# Patient Record
Sex: MALE | Race: WHITE | NOT HISPANIC OR LATINO | Employment: OTHER | URBAN - NONMETROPOLITAN AREA
[De-identification: names, ages, dates, MRNs, and addresses within clinical notes are randomized per-mention and may not be internally consistent; named-entity substitution may affect disease eponyms.]

---

## 2020-10-15 ENCOUNTER — EVALUATION (OUTPATIENT)
Dept: PHYSICAL THERAPY | Facility: CLINIC | Age: 60
End: 2020-10-15
Payer: COMMERCIAL

## 2020-10-15 ENCOUNTER — TRANSCRIBE ORDERS (OUTPATIENT)
Dept: PHYSICAL THERAPY | Facility: CLINIC | Age: 60
End: 2020-10-15

## 2020-10-15 DIAGNOSIS — S46.011D STRAIN OF TENDON OF RIGHT ROTATOR CUFF, SUBSEQUENT ENCOUNTER: Primary | ICD-10-CM

## 2020-10-15 PROCEDURE — 97162 PT EVAL MOD COMPLEX 30 MIN: CPT | Performed by: PHYSICAL THERAPIST

## 2020-10-20 ENCOUNTER — OFFICE VISIT (OUTPATIENT)
Dept: PHYSICAL THERAPY | Facility: CLINIC | Age: 60
End: 2020-10-20
Payer: COMMERCIAL

## 2020-10-20 DIAGNOSIS — S46.011D STRAIN OF TENDON OF RIGHT ROTATOR CUFF, SUBSEQUENT ENCOUNTER: Primary | ICD-10-CM

## 2020-10-20 PROCEDURE — 97140 MANUAL THERAPY 1/> REGIONS: CPT | Performed by: PHYSICAL THERAPIST

## 2020-10-20 PROCEDURE — 97110 THERAPEUTIC EXERCISES: CPT | Performed by: PHYSICAL THERAPIST

## 2020-10-23 ENCOUNTER — OFFICE VISIT (OUTPATIENT)
Dept: PHYSICAL THERAPY | Facility: CLINIC | Age: 60
End: 2020-10-23
Payer: COMMERCIAL

## 2020-10-23 DIAGNOSIS — S46.011D STRAIN OF TENDON OF RIGHT ROTATOR CUFF, SUBSEQUENT ENCOUNTER: Primary | ICD-10-CM

## 2020-10-23 PROCEDURE — 97140 MANUAL THERAPY 1/> REGIONS: CPT | Performed by: PHYSICAL THERAPIST

## 2020-10-23 PROCEDURE — 97110 THERAPEUTIC EXERCISES: CPT | Performed by: PHYSICAL THERAPIST

## 2020-10-27 ENCOUNTER — OFFICE VISIT (OUTPATIENT)
Dept: PHYSICAL THERAPY | Facility: CLINIC | Age: 60
End: 2020-10-27
Payer: COMMERCIAL

## 2020-10-27 DIAGNOSIS — S46.011D STRAIN OF TENDON OF RIGHT ROTATOR CUFF, SUBSEQUENT ENCOUNTER: Primary | ICD-10-CM

## 2020-10-27 PROCEDURE — 97110 THERAPEUTIC EXERCISES: CPT

## 2020-10-27 PROCEDURE — 97140 MANUAL THERAPY 1/> REGIONS: CPT

## 2020-10-30 ENCOUNTER — OFFICE VISIT (OUTPATIENT)
Dept: PHYSICAL THERAPY | Facility: CLINIC | Age: 60
End: 2020-10-30
Payer: COMMERCIAL

## 2020-10-30 DIAGNOSIS — S46.011D STRAIN OF TENDON OF RIGHT ROTATOR CUFF, SUBSEQUENT ENCOUNTER: Primary | ICD-10-CM

## 2020-10-30 PROCEDURE — 97140 MANUAL THERAPY 1/> REGIONS: CPT

## 2020-10-30 PROCEDURE — 97110 THERAPEUTIC EXERCISES: CPT

## 2020-11-03 ENCOUNTER — OFFICE VISIT (OUTPATIENT)
Dept: PHYSICAL THERAPY | Facility: CLINIC | Age: 60
End: 2020-11-03
Payer: COMMERCIAL

## 2020-11-03 DIAGNOSIS — S46.011D STRAIN OF TENDON OF RIGHT ROTATOR CUFF, SUBSEQUENT ENCOUNTER: Primary | ICD-10-CM

## 2020-11-03 PROCEDURE — 97110 THERAPEUTIC EXERCISES: CPT | Performed by: PHYSICAL THERAPIST

## 2020-11-03 PROCEDURE — 97140 MANUAL THERAPY 1/> REGIONS: CPT | Performed by: PHYSICAL THERAPIST

## 2020-11-10 ENCOUNTER — OFFICE VISIT (OUTPATIENT)
Dept: PHYSICAL THERAPY | Facility: CLINIC | Age: 60
End: 2020-11-10
Payer: COMMERCIAL

## 2020-11-10 DIAGNOSIS — S46.011D STRAIN OF TENDON OF RIGHT ROTATOR CUFF, SUBSEQUENT ENCOUNTER: Primary | ICD-10-CM

## 2020-11-10 PROCEDURE — 97140 MANUAL THERAPY 1/> REGIONS: CPT | Performed by: PHYSICAL THERAPIST

## 2020-11-10 PROCEDURE — 97110 THERAPEUTIC EXERCISES: CPT | Performed by: PHYSICAL THERAPIST

## 2020-11-17 ENCOUNTER — OFFICE VISIT (OUTPATIENT)
Dept: PHYSICAL THERAPY | Facility: CLINIC | Age: 60
End: 2020-11-17
Payer: COMMERCIAL

## 2020-11-17 DIAGNOSIS — S46.011D STRAIN OF TENDON OF RIGHT ROTATOR CUFF, SUBSEQUENT ENCOUNTER: Primary | ICD-10-CM

## 2020-11-17 PROCEDURE — 97140 MANUAL THERAPY 1/> REGIONS: CPT

## 2020-11-17 PROCEDURE — 97110 THERAPEUTIC EXERCISES: CPT

## 2020-11-20 ENCOUNTER — OFFICE VISIT (OUTPATIENT)
Dept: PHYSICAL THERAPY | Facility: CLINIC | Age: 60
End: 2020-11-20
Payer: COMMERCIAL

## 2020-11-20 DIAGNOSIS — S46.011D STRAIN OF TENDON OF RIGHT ROTATOR CUFF, SUBSEQUENT ENCOUNTER: Primary | ICD-10-CM

## 2020-11-20 PROCEDURE — 97140 MANUAL THERAPY 1/> REGIONS: CPT | Performed by: PHYSICAL THERAPIST

## 2020-11-20 PROCEDURE — 97110 THERAPEUTIC EXERCISES: CPT | Performed by: PHYSICAL THERAPIST

## 2020-11-24 ENCOUNTER — TRANSCRIBE ORDERS (OUTPATIENT)
Dept: PHYSICAL THERAPY | Facility: CLINIC | Age: 60
End: 2020-11-24

## 2020-11-24 ENCOUNTER — OFFICE VISIT (OUTPATIENT)
Dept: PHYSICAL THERAPY | Facility: CLINIC | Age: 60
End: 2020-11-24
Payer: COMMERCIAL

## 2020-11-24 DIAGNOSIS — S46.011D STRAIN OF TENDON OF RIGHT ROTATOR CUFF, SUBSEQUENT ENCOUNTER: Primary | ICD-10-CM

## 2020-11-24 PROCEDURE — 97110 THERAPEUTIC EXERCISES: CPT | Performed by: PHYSICAL THERAPIST

## 2020-11-24 PROCEDURE — 97140 MANUAL THERAPY 1/> REGIONS: CPT | Performed by: PHYSICAL THERAPIST

## 2020-12-01 ENCOUNTER — OFFICE VISIT (OUTPATIENT)
Dept: PHYSICAL THERAPY | Facility: CLINIC | Age: 60
End: 2020-12-01
Payer: COMMERCIAL

## 2020-12-01 DIAGNOSIS — S46.011D STRAIN OF TENDON OF RIGHT ROTATOR CUFF, SUBSEQUENT ENCOUNTER: Primary | ICD-10-CM

## 2020-12-01 PROCEDURE — 97110 THERAPEUTIC EXERCISES: CPT | Performed by: PHYSICAL THERAPIST

## 2020-12-01 PROCEDURE — 97140 MANUAL THERAPY 1/> REGIONS: CPT | Performed by: PHYSICAL THERAPIST

## 2020-12-04 ENCOUNTER — OFFICE VISIT (OUTPATIENT)
Dept: PHYSICAL THERAPY | Facility: CLINIC | Age: 60
End: 2020-12-04
Payer: COMMERCIAL

## 2020-12-04 DIAGNOSIS — S46.011D STRAIN OF TENDON OF RIGHT ROTATOR CUFF, SUBSEQUENT ENCOUNTER: Primary | ICD-10-CM

## 2020-12-04 PROCEDURE — 97110 THERAPEUTIC EXERCISES: CPT | Performed by: PHYSICAL THERAPIST

## 2020-12-04 PROCEDURE — 97140 MANUAL THERAPY 1/> REGIONS: CPT | Performed by: PHYSICAL THERAPIST

## 2020-12-08 ENCOUNTER — OFFICE VISIT (OUTPATIENT)
Dept: PHYSICAL THERAPY | Facility: CLINIC | Age: 60
End: 2020-12-08
Payer: COMMERCIAL

## 2020-12-08 DIAGNOSIS — S46.011D STRAIN OF TENDON OF RIGHT ROTATOR CUFF, SUBSEQUENT ENCOUNTER: Primary | ICD-10-CM

## 2020-12-08 PROCEDURE — 97140 MANUAL THERAPY 1/> REGIONS: CPT | Performed by: PHYSICAL THERAPIST

## 2020-12-08 PROCEDURE — 97110 THERAPEUTIC EXERCISES: CPT | Performed by: PHYSICAL THERAPIST

## 2020-12-22 ENCOUNTER — OFFICE VISIT (OUTPATIENT)
Dept: PHYSICAL THERAPY | Facility: CLINIC | Age: 60
End: 2020-12-22
Payer: COMMERCIAL

## 2020-12-22 DIAGNOSIS — S46.011D STRAIN OF TENDON OF RIGHT ROTATOR CUFF, SUBSEQUENT ENCOUNTER: Primary | ICD-10-CM

## 2020-12-22 PROCEDURE — 97110 THERAPEUTIC EXERCISES: CPT | Performed by: PHYSICAL THERAPIST

## 2020-12-22 PROCEDURE — 97140 MANUAL THERAPY 1/> REGIONS: CPT | Performed by: PHYSICAL THERAPIST

## 2020-12-23 ENCOUNTER — TRANSCRIBE ORDERS (OUTPATIENT)
Dept: PHYSICAL THERAPY | Facility: CLINIC | Age: 60
End: 2020-12-23

## 2020-12-23 DIAGNOSIS — S46.011D STRAIN OF TENDON OF RIGHT ROTATOR CUFF, SUBSEQUENT ENCOUNTER: Primary | ICD-10-CM

## 2020-12-29 ENCOUNTER — OFFICE VISIT (OUTPATIENT)
Dept: PHYSICAL THERAPY | Facility: CLINIC | Age: 60
End: 2020-12-29
Payer: COMMERCIAL

## 2020-12-29 DIAGNOSIS — S46.011D STRAIN OF TENDON OF RIGHT ROTATOR CUFF, SUBSEQUENT ENCOUNTER: Primary | ICD-10-CM

## 2020-12-29 PROCEDURE — 97110 THERAPEUTIC EXERCISES: CPT | Performed by: PHYSICAL THERAPIST

## 2020-12-29 PROCEDURE — 97140 MANUAL THERAPY 1/> REGIONS: CPT | Performed by: PHYSICAL THERAPIST

## 2021-01-05 ENCOUNTER — OFFICE VISIT (OUTPATIENT)
Dept: PHYSICAL THERAPY | Facility: CLINIC | Age: 61
End: 2021-01-05
Payer: COMMERCIAL

## 2021-01-05 DIAGNOSIS — S46.011D STRAIN OF TENDON OF RIGHT ROTATOR CUFF, SUBSEQUENT ENCOUNTER: Primary | ICD-10-CM

## 2021-01-05 PROCEDURE — 97140 MANUAL THERAPY 1/> REGIONS: CPT | Performed by: PHYSICAL THERAPIST

## 2021-01-05 PROCEDURE — 97110 THERAPEUTIC EXERCISES: CPT | Performed by: PHYSICAL THERAPIST

## 2021-01-05 NOTE — PROGRESS NOTES
Daily Note     Today's date: 2021  Patient name: Toyin Siddiqi  : 1960  MRN: [id number]  Referring provider: Tess Ramos MD  Dx:   Encounter Diagnosis     ICD-10-CM    1  Strain of tendon of right rotator cuff, subsequent encounter  S46 011D                   Subjective: Patient states "I haven't heard anything from the doctor yet"  Objective: See treatment diary below  Manuals 20   Right shoulder PROM 15 min 15 min 15 min 15 min 15min                            Neuro Re-Ed                                                                 Ther Ex        UBE 10 min 10 min 10 min 10 min 10 min   MTP/LTP GTB 3x10 GTB 3x10 GTB 3x10 GTB 3x10 GTB 3x10   IR/ER GTB 3x10 GTB 3x10 each GTB 3x10 each GTB 3x10 GTB 3x10   Shrugs 3# 3x10 3# 3x10 3# 3x10 3# 3x10 3# 3x10   Retractions        Bicep Curls 3# 3x10 3# 3x10 3# 3x10 3# 3x10 3# 3x10   Pulleys 30x flx 30x flx 30x Flx 30x flx 30x    Finger Ladder        Table Slides 30x flx 30x flx      Cane Flexion 30x 30x  30x 30x 30x   Cane Abduction        Scaption 15x  15x 15x 15x                   Ther Activity                        Gait Training                        Modalities                            Assessment: Tolerated treatment well  Patient exhibited good technique with therapeutic exercises  Patient encouraged to call MD for MRI results and further treatment  Patient with good PROM noted each direction, some IR tightness noted today  Plan: Continue per plan of care        Precautions: None

## 2021-01-15 ENCOUNTER — APPOINTMENT (OUTPATIENT)
Dept: PHYSICAL THERAPY | Facility: CLINIC | Age: 61
End: 2021-01-15
Payer: COMMERCIAL

## 2021-01-19 ENCOUNTER — APPOINTMENT (OUTPATIENT)
Dept: PHYSICAL THERAPY | Facility: CLINIC | Age: 61
End: 2021-01-19
Payer: COMMERCIAL

## 2021-01-22 ENCOUNTER — APPOINTMENT (OUTPATIENT)
Dept: PHYSICAL THERAPY | Facility: CLINIC | Age: 61
End: 2021-01-22
Payer: COMMERCIAL

## 2021-01-26 ENCOUNTER — APPOINTMENT (OUTPATIENT)
Dept: PHYSICAL THERAPY | Facility: CLINIC | Age: 61
End: 2021-01-26
Payer: COMMERCIAL

## 2021-01-29 ENCOUNTER — APPOINTMENT (OUTPATIENT)
Dept: PHYSICAL THERAPY | Facility: CLINIC | Age: 61
End: 2021-01-29
Payer: COMMERCIAL

## 2022-04-21 ENCOUNTER — OFFICE VISIT (OUTPATIENT)
Dept: URGENT CARE | Facility: CLINIC | Age: 62
End: 2022-04-21
Payer: COMMERCIAL

## 2022-04-21 VITALS
SYSTOLIC BLOOD PRESSURE: 117 MMHG | BODY MASS INDEX: 25.28 KG/M2 | WEIGHT: 180.6 LBS | DIASTOLIC BLOOD PRESSURE: 70 MMHG | OXYGEN SATURATION: 97 % | HEART RATE: 105 BPM | TEMPERATURE: 98.4 F | RESPIRATION RATE: 18 BRPM | HEIGHT: 71 IN

## 2022-04-21 DIAGNOSIS — L72.3 INFECTED SEBACEOUS CYST: ICD-10-CM

## 2022-04-21 DIAGNOSIS — L02.03 CARBUNCLE, FACE: Primary | ICD-10-CM

## 2022-04-21 DIAGNOSIS — L08.9 INFECTED SEBACEOUS CYST: ICD-10-CM

## 2022-04-21 PROBLEM — E78.5 DYSLIPIDEMIA: Status: ACTIVE | Noted: 2022-02-04

## 2022-04-21 PROBLEM — K22.70 BARRETT'S ESOPHAGUS WITHOUT DYSPLASIA: Status: ACTIVE | Noted: 2022-02-22

## 2022-04-21 PROBLEM — F17.200 TOBACCO USE DISORDER: Status: ACTIVE | Noted: 2022-03-25

## 2022-04-21 PROBLEM — I10 ESSENTIAL HYPERTENSION: Status: ACTIVE | Noted: 2022-01-06

## 2022-04-21 PROCEDURE — 87077 CULTURE AEROBIC IDENTIFY: CPT | Performed by: PHYSICIAN ASSISTANT

## 2022-04-21 PROCEDURE — 87205 SMEAR GRAM STAIN: CPT | Performed by: PHYSICIAN ASSISTANT

## 2022-04-21 PROCEDURE — 87070 CULTURE OTHR SPECIMN AEROBIC: CPT | Performed by: PHYSICIAN ASSISTANT

## 2022-04-21 PROCEDURE — 10060 I&D ABSCESS SIMPLE/SINGLE: CPT | Performed by: PHYSICIAN ASSISTANT

## 2022-04-21 PROCEDURE — 87186 SC STD MICRODIL/AGAR DIL: CPT | Performed by: PHYSICIAN ASSISTANT

## 2022-04-21 RX ORDER — TAMSULOSIN HYDROCHLORIDE 0.4 MG/1
0.4 CAPSULE ORAL EVERY MORNING
COMMUNITY
Start: 2022-04-01

## 2022-04-21 RX ORDER — CEPHALEXIN 500 MG/1
500 CAPSULE ORAL EVERY 8 HOURS SCHEDULED
Qty: 21 CAPSULE | Refills: 0 | Status: SHIPPED | OUTPATIENT
Start: 2022-04-21 | End: 2022-04-28

## 2022-04-21 RX ORDER — PANTOPRAZOLE SODIUM 40 MG/1
TABLET, DELAYED RELEASE ORAL
COMMUNITY
Start: 2022-02-03

## 2022-04-21 RX ORDER — SPIRONOLACTONE 25 MG/1
25 TABLET ORAL DAILY
COMMUNITY
Start: 2022-04-11

## 2022-04-21 RX ORDER — FUROSEMIDE 40 MG/1
TABLET ORAL
COMMUNITY
Start: 2022-02-25
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
## 2022-04-21 NOTE — PATIENT INSTRUCTIONS
Incision and Drainage   WHAT YOU NEED TO KNOW:   Incision and drainage (I & D) is a procedure to drain a pocket of fluid, such as pus or blood  DISCHARGE INSTRUCTIONS:   Medicines: You may need any of the following:  · NSAIDs , such as ibuprofen, help decrease swelling, pain, and fever  This medicine is available with or without a doctor's order  NSAIDs can cause stomach bleeding or kidney problems in certain people  If you take blood thinner medicine, always ask if NSAIDs are safe for you  Always read the medicine label and follow directions  Do not give these medicines to children under 10months of age without direction from your child's healthcare provider  · Prescription pain medication  may be given to decrease pain  Do not wait until the pain is severe before you take your medicine  Your healthcare provider may ask you to take pain medicine ½ hour before your follow-up visit for wound care  · Take your medicine as directed  Contact your healthcare provider if you think your medicine is not helping or if you have side effects  Tell him if you are allergic to any medicine  Keep a list of the medicines, vitamins, and herbs you take  Include the amounts, and when and why you take them  Bring the list or the pill bottles to follow-up visits  Carry your medicine list with you in case of an emergency  Follow up with your healthcare provider in 1 to 3 days, or as directed: You may need to return to have your incision cleaned and your bandages changed  Your healthcare provider will make sure your wound is healing well  Ask how to care for your wound at home  Bring a list of your questions so you remember to ask them during your visits  Wound care:  Keep your wound clean and dry as directed by your healthcare provider:  · Wash your hands  before and after you touch or clean your wound  · Flush or soak your wound  as directed      · Check your wound  for signs of infection, such as redness, swelling, and pain  · Change the packing or bandages  as directed  Ask for more information about what type of bandages to use  Elevate your wound: If your wound is on your arm or leg, keep it raised above the level of your heart as often as you can  This will help decrease swelling and pain  Prop your arm or leg on pillows or blankets to keep it elevated comfortably  Wear a splint as directed: You may need to wear a splint if your wound is on your hand, arm, or leg  A splint limits movement and helps your wound heal  Do not remove the splint until your healthcare provider says it is okay  Contact your healthcare provider if:   · You have fever or chills  · You feel more tired than usual     · Fluid builds up again and creates a pocket in the same area  · Your wound becomes red, swollen, and painful  · You have questions or concerns about your condition or care  Return to the emergency department if:   · You have red streaks or extreme pain near your wound  · Blood soaks through your bandage  · You have pain in your back, stomach, muscles, or joints  © Copyright [employer] 2018 Information is for End User's use only and may not be sold, redistributed or otherwise used for commercial purposes  All illustrations and images included in CareNotes® are the copyrighted property of A D A M , Inc  or [address] [place], [place] [address]shivani jeremias   The above information is an  only  It is not intended as medical advice for individual conditions or treatments  Talk to your doctor, nurse or pharmacist before following any medical regimen to see if it is safe and effective for you  Epidermal Inclusion Cysts   WHAT YOU NEED TO KNOW:   Epidermal inclusion cysts are the most common skin cysts in adults  These cysts are usually round, firm lumps filled with a cheese-like material called keratin  They are also called epidermoid, keratin, or sebaceous cysts  They can be found almost anywhere on your body   The cysts are most common on the face, back, neck, chest, and around your ears  They can be caused by blocked hair follicle and oil gland ducts in your skin  The cysts can grow larger and make it hard for you to sit or walk if they are on your legs or back  Epidermal inclusion cysts may grow slowly but are not cancer  DISCHARGE INSTRUCTIONS:   Call your doctor or dermatologist if:   · Your cyst becomes swollen, red, and painful  · Your cyst is large and leads to trouble moving or a deformed area  · You have questions or concerns about your condition or care  Medicines:   · Antibiotics  may be given to treat or prevent an infection  · Take your medicine as directed  Contact your healthcare provider if you think your medicine is not helping or if you have side effects  Tell him of her if you are allergic to any medicine  Keep a list of the medicines, vitamins, and herbs you take  Include the amounts, and when and why you take them  Bring the list or the pill bottles to follow-up visits  Carry your medicine list with you in case of an emergency  Follow up with your doctor or dermatologist as directed:  Write down your questions so you remember to ask them at your visits  © Copyright [employer] 2022 Information is for End User's use only and may not be sold, redistributed or otherwise used for commercial purposes  All illustrations and images included in CareNotes® are the copyrighted property of [employer]  or Le[employer] Dinh  The above information is an  only  It is not intended as medical advice for individual conditions or treatments  Talk to your doctor, nurse or pharmacist before following any medical regimen to see if it is safe and effective for you

## 2022-04-25 LAB
BACTERIA WND AEROBE CULT: ABNORMAL
GRAM STN SPEC: ABNORMAL

## 2022-05-31 ENCOUNTER — RX ONLY (RX ONLY)
Age: 62
End: 2022-05-31

## 2022-05-31 ENCOUNTER — OPTICAL OFFICE (OUTPATIENT)
Dept: URBAN - NONMETROPOLITAN AREA CLINIC 4 | Facility: CLINIC | Age: 62
Setting detail: OPHTHALMOLOGY
End: 2022-05-31
Payer: COMMERCIAL

## 2022-05-31 ENCOUNTER — DOCTOR'S OFFICE (OUTPATIENT)
Dept: URBAN - NONMETROPOLITAN AREA CLINIC 1 | Facility: CLINIC | Age: 62
Setting detail: OPHTHALMOLOGY
End: 2022-05-31
Payer: COMMERCIAL

## 2022-05-31 DIAGNOSIS — H25.013: ICD-10-CM

## 2022-05-31 DIAGNOSIS — H52.223: ICD-10-CM

## 2022-05-31 DIAGNOSIS — H43.393: ICD-10-CM

## 2022-05-31 DIAGNOSIS — H52.4: ICD-10-CM

## 2022-05-31 PROCEDURE — V2744 TINT PHOTOCHROMATIC LENS/ES: HCPCS | Performed by: OPTOMETRIST

## 2022-05-31 PROCEDURE — V2202 LENS SPHERE BIFOCAL 7.12-20.: HCPCS | Performed by: OPTOMETRIST

## 2022-05-31 PROCEDURE — V2020 VISION SVCS FRAMES PURCHASES: HCPCS | Performed by: OPTOMETRIST

## 2022-05-31 PROCEDURE — 92004 COMPRE OPH EXAM NEW PT 1/>: CPT | Performed by: OPTOMETRIST

## 2022-05-31 PROCEDURE — 92015 DETERMINE REFRACTIVE STATE: CPT | Performed by: OPTOMETRIST

## 2022-05-31 PROCEDURE — V2203 LENS SPHCYL BIFOCAL 4.00D/.1: HCPCS | Performed by: OPTOMETRIST

## 2022-05-31 PROCEDURE — V2784 LENS POLYCARB OR EQUAL: HCPCS | Performed by: OPTOMETRIST

## 2022-05-31 ASSESSMENT — SPHEQUIV_DERIVED
OD_SPHEQUIV: 2.125
OS_SPHEQUIV: 2.25
OD_SPHEQUIV: 2.125
OS_SPHEQUIV: 2.25

## 2022-05-31 ASSESSMENT — REFRACTION_CURRENTRX
OS_SPHERE: *1.25
OS_ADD: +2.75
OD_ADD: +2.75
OD_SPHERE: +1.50
OD_OVR_VA: 20/
OS_OVR_VA: 20/
OS_CYLINDER: -0.25
OD_AXIS: 103
OS_AXIS: 102
OD_CYLINDER: -0.75

## 2022-05-31 ASSESSMENT — REFRACTION_MANIFEST
OD_ADD: +2.50
OD_CYLINDER: -1.25
OD_VA2: 20/30+2
OS_ADD: +2.50
OD_SPHERE: +2.75
OS_VA1: 20/30+2
OS_AXIS: 100
OD_VA1: 20/30+2
OD_AXIS: 090
OS_VA2: 20/30+2
OS_SPHERE: +3.00
OS_CYLINDER: -1.50

## 2022-05-31 ASSESSMENT — REFRACTION_AUTOREFRACTION
OD_CYLINDER: -1.75
OS_AXIS: 102
OD_AXIS: 89
OS_SPHERE: +3.25
OD_SPHERE: +3.00
OS_CYLINDER: -2.00

## 2022-05-31 ASSESSMENT — VISUAL ACUITY
OS_BCVA: 20/30-2
OD_BCVA: 20/30-2

## 2022-05-31 ASSESSMENT — TONOMETRY
OD_IOP_MMHG: 14
OS_IOP_MMHG: 14

## 2022-05-31 ASSESSMENT — CONFRONTATIONAL VISUAL FIELD TEST (CVF)
OS_FINDINGS: FULL
OD_FINDINGS: FULL

## 2023-05-31 ENCOUNTER — DOCTOR'S OFFICE (OUTPATIENT)
Dept: URBAN - NONMETROPOLITAN AREA CLINIC 1 | Facility: CLINIC | Age: 63
Setting detail: OPHTHALMOLOGY
End: 2023-05-31
Payer: COMMERCIAL

## 2023-05-31 ENCOUNTER — OPTICAL OFFICE (OUTPATIENT)
Dept: URBAN - NONMETROPOLITAN AREA CLINIC 4 | Facility: CLINIC | Age: 63
Setting detail: OPHTHALMOLOGY
End: 2023-05-31
Payer: COMMERCIAL

## 2023-05-31 DIAGNOSIS — H25.013: ICD-10-CM

## 2023-05-31 DIAGNOSIS — H52.223: ICD-10-CM

## 2023-05-31 DIAGNOSIS — H52.4: ICD-10-CM

## 2023-05-31 DIAGNOSIS — H43.393: ICD-10-CM

## 2023-05-31 PROCEDURE — 92015 DETERMINE REFRACTIVE STATE: CPT | Performed by: OPTOMETRIST

## 2023-05-31 PROCEDURE — 92014 COMPRE OPH EXAM EST PT 1/>: CPT | Performed by: OPTOMETRIST

## 2023-05-31 PROCEDURE — V2784 LENS POLYCARB OR EQUAL: HCPCS | Performed by: OPTOMETRIST

## 2023-05-31 PROCEDURE — V2020 VISION SVCS FRAMES PURCHASES: HCPCS | Performed by: OPTOMETRIST

## 2023-05-31 PROCEDURE — V2203 LENS SPHCYL BIFOCAL 4.00D/.1: HCPCS | Performed by: OPTOMETRIST

## 2023-05-31 ASSESSMENT — REFRACTION_MANIFEST
OD_CYLINDER: -1.25
OD_VA1: 20/30+2
OD_ADD: +2.50
OS_SPHERE: +3.25
OS_VA1: 20/30+2
OS_ADD: +2.50
OD_AXIS: 095
OD_VA2: 20/30+2
OS_CYLINDER: -1.50
OD_SPHERE: +2.75
OS_VA2: 20/30+2
OS_AXIS: 090

## 2023-05-31 ASSESSMENT — CONFRONTATIONAL VISUAL FIELD TEST (CVF)
OS_FINDINGS: FULL
OD_FINDINGS: FULL

## 2023-05-31 ASSESSMENT — SPHEQUIV_DERIVED
OS_SPHEQUIV: 2.5
OD_SPHEQUIV: 2
OD_SPHEQUIV: 2.125
OS_SPHEQUIV: 2.5

## 2023-05-31 ASSESSMENT — REFRACTION_AUTOREFRACTION
OD_SPHERE: +2.25
OD_AXIS: 097
OD_CYLINDER: -0.50
OS_AXIS: 088
OS_SPHERE: +3.50
OS_CYLINDER: -2.00

## 2023-05-31 ASSESSMENT — REFRACTION_CURRENTRX
OS_ADD: +2.75
OS_AXIS: 104
OD_ADD: +2.75
OD_SPHERE: +1.50
OD_AXIS: 102
OD_OVR_VA: 20/
OD_CYLINDER: -0.75
OS_SPHERE: +1.50
OS_CYLINDER: -0.50
OS_OVR_VA: 20/

## 2023-05-31 ASSESSMENT — VISUAL ACUITY
OS_BCVA: 20/40
OD_BCVA: 20/30-2

## 2023-05-31 ASSESSMENT — TONOMETRY
OS_IOP_MMHG: 14
OD_IOP_MMHG: 14

## 2023-07-14 ENCOUNTER — EVALUATION (OUTPATIENT)
Dept: PHYSICAL THERAPY | Facility: CLINIC | Age: 63
End: 2023-07-14
Payer: COMMERCIAL

## 2023-07-14 DIAGNOSIS — Z96.611 HISTORY OF TOTAL SHOULDER REPLACEMENT, RIGHT: Primary | ICD-10-CM

## 2023-07-14 PROCEDURE — 97162 PT EVAL MOD COMPLEX 30 MIN: CPT | Performed by: PHYSICAL THERAPIST

## 2023-07-14 PROCEDURE — 97140 MANUAL THERAPY 1/> REGIONS: CPT | Performed by: PHYSICAL THERAPIST

## 2023-07-14 NOTE — LETTER
2023    Atilio Dodd DO  [facility] [address]    Patient: El Reza. YOB: 1960   Date of Visit: 2023     Encounter Diagnosis     ICD-10-CM    1. History of total shoulder replacement, right  Z96.611           Dear Dr. Agueda Burt: Thank you for your recent referral of El Reza. . Please review the attached evaluation summary from Derrick's recent visit. Please verify that you agree with the plan of care by signing the attached order. If you have any questions or concerns, please do not hesitate to call. I sincerely appreciate the opportunity to share in the care of one of your patients and hope to have another opportunity to work with you in the near future. Sincerely,    Veronica Zapata, PT      Referring Provider:      I certify that I have read the below Plan of Care and certify the need for these services furnished under this plan of treatment while under my care. Atilio Dodd DO   [place] [address]  [address]          PT Evaluation     Today's date: 2023  Patient name: El Reza. : 1960  MRN: [id number]  Referring provider: Ken Sesay*  Dx:   Encounter Diagnosis     ICD-10-CM    1. History of total shoulder replacement, right  Z96.611                      Assessment  Assessment details: Patient is a 63year old male who presents to PT s/p right total shoulder replacement done . PT examination shows limitations including weakness, decreased rom, abnormal posture and increased pain limiting functional and work ability. Patient would benefit from PT intervention including exercises for rom, strength and stability, functional training, manual therapy, HEP, postural training, aerobic conditioning and pain relieving modalities in order to maximize functional independence and work ability.    Impairments: abnormal or restricted ROM, activity intolerance, impaired physical strength, lacks appropriate home exercise program, pain with function and poor posture     Goals  ST. Initiate HEP  2. Patient to report decreased pain at worst by 1-2 in 4 weeks    LT. Patient to increase right shoulder rom to [facility] in 8 weeks  2. Patient to increase right shoulder strength to 5/5 in 8-12 weeks  3. Patient to report decreased pain at worst to 0/10 in 8 weeks  4. Patient to return to normal functional and work ability in 8-12 weeks    Plan  Patient would benefit from: PT eval and skilled physical therapy  Planned modality interventions: cryotherapy, thermotherapy: hydrocollator packs, ultrasound and unattended electrical stimulation  Other planned modality interventions: Modalities PRN  Planned therapy interventions: joint mobilization, ADL training, manual therapy, neuromuscular re-education, patient education, postural training, strengthening, stretching, therapeutic activities, therapeutic exercise, therapeutic training, graded activity, functional ROM exercises, flexibility, graded exercise, home exercise program and IADL retraining  Frequency: 2x week  Duration in visits: 8  Duration in weeks: 4  Treatment plan discussed with: patient        Subjective Evaluation    History of Present Illness  Date of onset: 2023  Mechanism of injury: surgery  Mechanism of injury: Patient presents to PT s/p right reverse total shoulder replacement done on 23. Patient underwent surgery following failure of conservative measures. Patient had significant arthritis so patient opted for surgery. Patient reports feeling pretty good at this time and reports about 3/10 pain at worst. Patient reports he is doing his exercises at home and reports the pulleys are most beneficial. Patient is now referred to oppt.    Patient Goals  Patient goals for therapy: decreased pain, increased strength and return to work    Pain  At best pain ratin  At worst pain rating: 3  Quality: dull ache  Aggravating factors: overhead activity and lifting          Objective     Static Posture     Head  Forward. Shoulders  Rounded. Neurological Testing     Sensation     Shoulder   Left Shoulder   Intact: light touch    Right Shoulder   Intact: light touch    Active Range of Motion     Right Shoulder   Flexion: 70 degrees     Passive Range of Motion     Right Shoulder   Flexion: 110 degrees     Strength/Myotome Testing     Additional Strength Details  Strength NT due to recent surgery. Will assess when able.                Precautions Per Protocol, No IR/ER AROM until week 6       Manuals 7/14       Right Shoulder PROM 8 min                               Neuro Re-Ed                                                                 Ther Ex        Pulleys        Table Slides        Shrugs        Retractions        Bicep Curls        Supine Cane Flex        Forward Shoulder flexion                                        Ther Activity                        Gait Training                        Modalities

## 2023-07-14 NOTE — PROGRESS NOTES
PT Evaluation     Today's date: 2023  Patient name: Dewayne Blank : 1960  MRN: [id number]  Referring provider: Marquez Lovett*  Dx:   Encounter Diagnosis     ICD-10-CM    1. History of total shoulder replacement, right  Z96.611                      Assessment  Assessment details: Patient is a 63year old male who presents to PT s/p right total shoulder replacement done . PT examination shows limitations including weakness, decreased rom, abnormal posture and increased pain limiting functional and work ability. Patient would benefit from PT intervention including exercises for rom, strength and stability, functional training, manual therapy, HEP, postural training, aerobic conditioning and pain relieving modalities in order to maximize functional independence and work ability. Impairments: abnormal or restricted ROM, activity intolerance, impaired physical strength, lacks appropriate home exercise program, pain with function and poor posture     Goals  ST. Initiate HEP  2. Patient to report decreased pain at worst by 1-2 in 4 weeks    LT. Patient to increase right shoulder rom to [facility] in 8 weeks  2. Patient to increase right shoulder strength to 5/5 in 8-12 weeks  3. Patient to report decreased pain at worst to 0/10 in 8 weeks  4.  Patient to return to normal functional and work ability in 8-12 weeks    Plan  Patient would benefit from: PT eval and skilled physical therapy  Planned modality interventions: cryotherapy, thermotherapy: hydrocollator packs, ultrasound and unattended electrical stimulation  Other planned modality interventions: Modalities PRN  Planned therapy interventions: joint mobilization, ADL training, manual therapy, neuromuscular re-education, patient education, postural training, strengthening, stretching, therapeutic activities, therapeutic exercise, therapeutic training, graded activity, functional ROM exercises, flexibility, graded exercise, home exercise program and IADL retraining  Frequency: 2x week  Duration in visits: 8  Duration in weeks: 4  Treatment plan discussed with: patient        Subjective Evaluation    History of Present Illness  Date of onset: 2023  Mechanism of injury: surgery  Mechanism of injury: Patient presents to PT s/p right reverse total shoulder replacement done on 23. Patient underwent surgery following failure of conservative measures. Patient had significant arthritis so patient opted for surgery. Patient reports feeling pretty good at this time and reports about 3/10 pain at worst. Patient reports he is doing his exercises at home and reports the pulleys are most beneficial. Patient is now referred to oppt. Patient Goals  Patient goals for therapy: decreased pain, increased strength and return to work    Pain  At best pain ratin  At worst pain rating: 3  Quality: dull ache  Aggravating factors: overhead activity and lifting          Objective     Static Posture     Head  Forward. Shoulders  Rounded. Neurological Testing     Sensation     Shoulder   Left Shoulder   Intact: light touch    Right Shoulder   Intact: light touch    Active Range of Motion     Right Shoulder   Flexion: 70 degrees     Passive Range of Motion     Right Shoulder   Flexion: 110 degrees     Strength/Myotome Testing     Additional Strength Details  Strength NT due to recent surgery. Will assess when able.                  Precautions Per Protocol, No IR/ER AROM until week 6       Manuals        Right Shoulder PROM 8 min                               Neuro Re-Ed                                                                 Ther Ex        Pulleys        Table Slides        Shrugs        Retractions        Bicep Curls        Supine Cane Flex        Forward Shoulder flexion                                        Ther Activity                        Gait Training                        Modalities

## 2023-07-20 ENCOUNTER — APPOINTMENT (OUTPATIENT)
Dept: PHYSICAL THERAPY | Facility: CLINIC | Age: 63
End: 2023-07-20
Payer: COMMERCIAL

## 2023-07-21 ENCOUNTER — OFFICE VISIT (OUTPATIENT)
Dept: PHYSICAL THERAPY | Facility: CLINIC | Age: 63
End: 2023-07-21
Payer: COMMERCIAL

## 2023-07-21 DIAGNOSIS — Z96.611 HISTORY OF TOTAL SHOULDER REPLACEMENT, RIGHT: Primary | ICD-10-CM

## 2023-07-21 PROCEDURE — 97110 THERAPEUTIC EXERCISES: CPT | Performed by: PHYSICAL THERAPIST

## 2023-07-21 PROCEDURE — 97140 MANUAL THERAPY 1/> REGIONS: CPT | Performed by: PHYSICAL THERAPIST

## 2023-07-21 NOTE — PROGRESS NOTES
Daily Note     Today's date: 2023  Patient name: Gianna Mckinnon. : 1960  MRN: [id number]  Referring provider: Vikram Sargent*  Dx:   Encounter Diagnosis     ICD-10-CM    1. History of total shoulder replacement, right  Z96.611                      Subjective: Patient denies any issues with right shoulder. Objective: See treatment diary below      Assessment: Patient with good tolerance to first treatment today. Minimal VC's required for correct performance with TE. Good rom noted in right shoulder. Plan: Continue per plan of care.         Precautions Per Protocol, No IR/ER AROM until week 6       Manuals       Right Shoulder PROM 8 min 15 min                              Neuro Re-Ed                                                                 Ther Ex        Pulleys  5 min      Table Slides        Shrugs  2x10      Retractions  2x10      Bicep Curls        Supine Cane Flex  2x10      Forward Shoulder flexion                                        Ther Activity                        Gait Training                        Modalities

## 2023-07-27 ENCOUNTER — OFFICE VISIT (OUTPATIENT)
Dept: PHYSICAL THERAPY | Facility: CLINIC | Age: 63
End: 2023-07-27
Payer: COMMERCIAL

## 2023-07-27 DIAGNOSIS — Z96.611 HISTORY OF TOTAL SHOULDER REPLACEMENT, RIGHT: Primary | ICD-10-CM

## 2023-07-27 PROCEDURE — 97110 THERAPEUTIC EXERCISES: CPT | Performed by: PHYSICAL THERAPIST

## 2023-07-27 PROCEDURE — 97140 MANUAL THERAPY 1/> REGIONS: CPT | Performed by: PHYSICAL THERAPIST

## 2023-07-27 NOTE — PROGRESS NOTES
Daily Note     Today's date: 2023  Patient name: Thu Burt. : 1960  MRN: [id number]  Referring provider: Donna Alvarez*  Dx:   Encounter Diagnosis     ICD-10-CM    1. History of total shoulder replacement, right  Z96.611                      Subjective: Patient states "I fell off a step ladder on  onto my back". Objective: See treatment diary below      Assessment: Tolerated treatment well. Patient exhibited good technique with therapeutic exercises. Improved ROM noted t/o right shoulder today. Plan: Continue per plan of care.       Precautions Per Protocol, No IR/ER AROM until week 6       Manuals      Right Shoulder PROM 8 min 15 min 15 min                             Neuro Re-Ed                                                                 Ther Ex        Pulleys  5 min 5 min     Table Slides        Shrugs  2x10 2x10     Retractions  2x10 2x10     Bicep Curls        Supine Cane Flex  2x10 2x10     Forward Shoulder flexion        Finger Ladder   5x5" #20                             Ther Activity                        Gait Training                        Modalities

## 2023-07-31 ENCOUNTER — OFFICE VISIT (OUTPATIENT)
Dept: PHYSICAL THERAPY | Facility: CLINIC | Age: 63
End: 2023-07-31
Payer: COMMERCIAL

## 2023-07-31 DIAGNOSIS — Z96.611 HISTORY OF TOTAL SHOULDER REPLACEMENT, RIGHT: Primary | ICD-10-CM

## 2023-07-31 PROCEDURE — 97110 THERAPEUTIC EXERCISES: CPT | Performed by: PHYSICAL THERAPIST

## 2023-07-31 PROCEDURE — 97140 MANUAL THERAPY 1/> REGIONS: CPT | Performed by: PHYSICAL THERAPIST

## 2023-07-31 NOTE — PROGRESS NOTES
Daily Note     Today's date: 2023  Patient name: Jose Craig : 1960  MRN: [id number]  Referring provider: Mark Wayne*  Dx:   Encounter Diagnosis     ICD-10-CM    1. History of total shoulder replacement, right  Z96.611                      Subjective: Patient reports some pain in right shoulder. Objective: See treatment diary below      Assessment: Tolerated treatment well. Patient exhibited good technique with therapeutic exercises. Improving ROM noted t/o right shoulder. Plan: Continue per plan of care.       Precautions Per Protocol, No IR/ER AROM until week 6       Manuals     Right Shoulder PROM 8 min 15 min 15 min 15 min                            Neuro Re-Ed                                                                 Ther Ex        Pulleys  5 min 5 min 5 min    Table Slides        Shrugs  2x10 2x10 2x10    Retractions  2x10 2x10 2x10    Bicep Curls        Supine Cane Flex  2x10 2x10 2x10    Forward Shoulder flexion        Finger Ladder   5x5" #20 5x5" #20    Scaption    5x                    Ther Activity                        Gait Training                        Modalities

## 2023-08-01 ENCOUNTER — APPOINTMENT (OUTPATIENT)
Dept: PHYSICAL THERAPY | Facility: CLINIC | Age: 63
End: 2023-08-01
Payer: COMMERCIAL

## 2023-08-03 ENCOUNTER — APPOINTMENT (OUTPATIENT)
Dept: PHYSICAL THERAPY | Facility: CLINIC | Age: 63
End: 2023-08-03
Payer: COMMERCIAL

## 2023-08-07 ENCOUNTER — OFFICE VISIT (OUTPATIENT)
Dept: PHYSICAL THERAPY | Facility: CLINIC | Age: 63
End: 2023-08-07
Payer: COMMERCIAL

## 2023-08-07 DIAGNOSIS — Z96.611 HISTORY OF TOTAL SHOULDER REPLACEMENT, RIGHT: Primary | ICD-10-CM

## 2023-08-07 PROCEDURE — 97140 MANUAL THERAPY 1/> REGIONS: CPT | Performed by: PHYSICAL THERAPIST

## 2023-08-07 PROCEDURE — 97110 THERAPEUTIC EXERCISES: CPT | Performed by: PHYSICAL THERAPIST

## 2023-08-07 NOTE — PROGRESS NOTES
Daily Note     Today's date: 2023  Patient name: Freddy Eng. : 1960  MRN: [id number]  Referring provider: Nahed Jenkins*  Dx:   Encounter Diagnosis     ICD-10-CM    1. History of total shoulder replacement, right  Z96.611                      Subjective: "I think I messed it up". Patient reports he worked on building a deck last Monday and Tuesday and developed severe pain in shoulder Wednesday. Objective: See treatment diary below      Assessment: Exercised per patient tolerance today. No increased pain reported with treatment today. Will continue to monitor symptoms and adjust POC as necessary. Plan: Continue per plan of care.       Precautions Per Protocol, No IR/ER AROM until week 6       Manuals    Right Shoulder PROM 8 min 15 min 15 min 15 min 15 min                           Neuro Re-Ed                                                                 Ther Ex        Pulleys  5 min 5 min 5 min 5 min   Table Slides        Shrugs  2x10 2x10 2x10    Retractions  2x10 2x10 2x10    Bicep Curls        Supine Cane Flex  2x10 2x10 2x10 2x10   Forward Shoulder flexion        Finger Ladder   5x5" #20 5x5" #20    Scaption    5x                    Ther Activity                        Gait Training                        Modalities

## 2023-08-08 ENCOUNTER — APPOINTMENT (OUTPATIENT)
Dept: PHYSICAL THERAPY | Facility: CLINIC | Age: 63
End: 2023-08-08
Payer: COMMERCIAL

## 2023-08-09 ENCOUNTER — OFFICE VISIT (OUTPATIENT)
Dept: PHYSICAL THERAPY | Facility: CLINIC | Age: 63
End: 2023-08-09
Payer: COMMERCIAL

## 2023-08-09 DIAGNOSIS — Z96.611 HISTORY OF TOTAL SHOULDER REPLACEMENT, RIGHT: Primary | ICD-10-CM

## 2023-08-09 PROCEDURE — 97140 MANUAL THERAPY 1/> REGIONS: CPT | Performed by: PHYSICAL THERAPIST

## 2023-08-09 PROCEDURE — 97110 THERAPEUTIC EXERCISES: CPT | Performed by: PHYSICAL THERAPIST

## 2023-08-09 NOTE — PROGRESS NOTES
Daily Note     Today's date: 2023  Patient name: Festus Sweeney. : 1960  MRN: [id number]  Referring provider: Freedom Powell*  Dx:   Encounter Diagnosis     ICD-10-CM    1. History of total shoulder replacement, right  Z96.611                      Subjective: Patient reports his shoulder is a little better than the other day. Objective: See treatment diary below      Assessment: Tolerated treatment well. Patient exhibited good technique with therapeutic exercises. Improved PROM noted today with manual stretching. Plan: Continue per plan of care.       Precautions Per Protocol, No IR/ER AROM until week 6       Manuals    Right Shoulder PROM 15 min 15 min 15 min 15 min 15 min                           Neuro Re-Ed                                                                 Ther Ex        Pulleys 5 min 5 min 5 min 5 min 5 min   Table Slides        Shrugs 2x10 2x10 2x10 2x10    Retractions 2x10 2x10 2x10 2x10    Bicep Curls        Supine Cane Flex 2x10 2x10 2x10 2x10 2x10   Forward Shoulder flexion        Finger Ladder 10x5" 20#  5x5" #20 5x5" #20    Scaption 5x   5x                    Ther Activity                        Gait Training                        Modalities

## 2023-08-10 ENCOUNTER — APPOINTMENT (OUTPATIENT)
Dept: PHYSICAL THERAPY | Facility: CLINIC | Age: 63
End: 2023-08-10
Payer: COMMERCIAL

## 2023-08-14 ENCOUNTER — OFFICE VISIT (OUTPATIENT)
Dept: PHYSICAL THERAPY | Facility: CLINIC | Age: 63
End: 2023-08-14
Payer: COMMERCIAL

## 2023-08-14 DIAGNOSIS — Z96.611 HISTORY OF TOTAL SHOULDER REPLACEMENT, RIGHT: Primary | ICD-10-CM

## 2023-08-14 PROCEDURE — 97110 THERAPEUTIC EXERCISES: CPT | Performed by: PHYSICAL THERAPIST

## 2023-08-14 PROCEDURE — 97140 MANUAL THERAPY 1/> REGIONS: CPT | Performed by: PHYSICAL THERAPIST

## 2023-08-14 NOTE — PROGRESS NOTES
Daily Note     Today's date: 2023  Patient name: Nani Diallo. : 1960  MRN: [id number]  Referring provider: Aparna Bryan*  Dx:   Encounter Diagnosis     ICD-10-CM    1. History of total shoulder replacement, right  Z96.611                      Subjective: Patient states "I'm doing alright today". Objective: See treatment diary below      Assessment: Tolerated treatment well. Patient exhibited good technique with therapeutic exercises. ROM improving t/o right shoulder. Plan: Continue per plan of care.       Precautions Per Protocol, No IR/ER AROM until week 6       Manuals    Right Shoulder PROM 15 min 15 min 15 min 15 min 15 min                           Neuro Re-Ed                                                                 Ther Ex        Pulleys 5 min 5 min 5 min 5 min 5 min   Table Slides        Shrugs 2x10 2x10 2x10 2x10    Retractions 2x10 2x10 2x10 2x10    Bicep Curls        Supine Cane Flex 2x10 2x10 2x10 2x10 2x10   Forward Shoulder flexion        Finger Ladder 10x5" 20# 10x5" #20 5x5" #20 5x5" #20    Scaption 5x 5x  5x                    Ther Activity                        Gait Training                        Modalities

## 2023-08-15 ENCOUNTER — APPOINTMENT (OUTPATIENT)
Dept: PHYSICAL THERAPY | Facility: CLINIC | Age: 63
End: 2023-08-15
Payer: COMMERCIAL

## 2023-08-16 ENCOUNTER — OFFICE VISIT (OUTPATIENT)
Dept: PHYSICAL THERAPY | Facility: CLINIC | Age: 63
End: 2023-08-16
Payer: COMMERCIAL

## 2023-08-16 DIAGNOSIS — Z96.611 HISTORY OF TOTAL SHOULDER REPLACEMENT, RIGHT: Primary | ICD-10-CM

## 2023-08-16 PROCEDURE — 97110 THERAPEUTIC EXERCISES: CPT | Performed by: PHYSICAL THERAPIST

## 2023-08-16 PROCEDURE — 97140 MANUAL THERAPY 1/> REGIONS: CPT | Performed by: PHYSICAL THERAPIST

## 2023-08-16 NOTE — PROGRESS NOTES
Daily Note     Today's date: 2023  Patient name: Apolinar Breaux. : 1960  MRN: [id number]  Referring provider: Abigail Yousif*  Dx:   Encounter Diagnosis     ICD-10-CM    1. History of total shoulder replacement, right  Z96.611                      Subjective: Patient states "I'm doing alright today". Objective: See treatment diary below      Assessment: Tolerated treatment well. Patient exhibited good technique with therapeutic exercises. Right shoulder rom continues to improve. Plan: Continue per plan of care.       Precautions Per Protocol, No IR/ER AROM until week 6       Manuals    Right Shoulder PROM 15 min 15 min 15 min 15 min 15 min                           Neuro Re-Ed                                                                 Ther Ex        Pulleys 5 min 5 min 5 min 5 min 5 min   Table Slides        Shrugs 2x10 2x10 2x10 2x10    Retractions 2x10 2x10 2x10 2x10    Bicep Curls        Supine Cane Flex 2x10 2x10 2x10 2x10 2x10   Forward Shoulder flexion        Finger Ladder 10x5" 20# 10x5" #20 10x5" #20 5x5" #20    Scaption 5x 5x 5x 5x                    Ther Activity                        Gait Training                        Modalities

## 2023-08-17 ENCOUNTER — APPOINTMENT (OUTPATIENT)
Dept: PHYSICAL THERAPY | Facility: CLINIC | Age: 63
End: 2023-08-17
Payer: COMMERCIAL

## 2023-08-22 ENCOUNTER — APPOINTMENT (OUTPATIENT)
Dept: PHYSICAL THERAPY | Facility: CLINIC | Age: 63
End: 2023-08-22
Payer: COMMERCIAL

## 2023-08-23 ENCOUNTER — OFFICE VISIT (OUTPATIENT)
Dept: PHYSICAL THERAPY | Facility: CLINIC | Age: 63
End: 2023-08-23
Payer: COMMERCIAL

## 2023-08-23 DIAGNOSIS — Z96.611 HISTORY OF TOTAL SHOULDER REPLACEMENT, RIGHT: Primary | ICD-10-CM

## 2023-08-23 PROCEDURE — 97110 THERAPEUTIC EXERCISES: CPT | Performed by: PHYSICAL THERAPIST

## 2023-08-23 PROCEDURE — 97140 MANUAL THERAPY 1/> REGIONS: CPT | Performed by: PHYSICAL THERAPIST

## 2023-08-23 NOTE — PROGRESS NOTES
Daily Note     Today's date: 2023  Patient name: Ellie Heard. : 1960  MRN: [id number]  Referring provider: Soo Cedeno*  Dx:   Encounter Diagnosis     ICD-10-CM    1. History of total shoulder replacement, right  Z96.611                      Subjective: Patient without new c/o today. Objective: See treatment diary below      Assessment: Tolerated treatment well. Patient exhibited good technique with therapeutic exercises      Plan: Continue per plan of care.       Precautions Per Protocol       Manuals    Right Shoulder PROM 15 min 15 min 15 min 15 min 15 min                           Neuro Re-Ed                                                                 Ther Ex        Pulleys 5 min 5 min 5 min 5 min 5 min   Table Slides        Shrugs 2x10 2x10 2x10 2x10    Retractions 2x10 2x10 2x10 2x10    Bicep Curls        Supine Cane Flex 2x10 2x10 2x10 2x10 2x10   Forward Shoulder flexion        Finger Ladder 10x5" 20# 10x5" #20 10x5" #20 10x5" #20    Scaption 5x 5x 5x 5x                    Ther Activity                        Gait Training                        Modalities

## 2023-08-24 ENCOUNTER — APPOINTMENT (OUTPATIENT)
Dept: PHYSICAL THERAPY | Facility: CLINIC | Age: 63
End: 2023-08-24
Payer: COMMERCIAL

## 2023-08-28 ENCOUNTER — OFFICE VISIT (OUTPATIENT)
Dept: PHYSICAL THERAPY | Facility: CLINIC | Age: 63
End: 2023-08-28
Payer: COMMERCIAL

## 2023-08-28 DIAGNOSIS — Z96.611 HISTORY OF TOTAL SHOULDER REPLACEMENT, RIGHT: Primary | ICD-10-CM

## 2023-08-28 PROCEDURE — 97140 MANUAL THERAPY 1/> REGIONS: CPT | Performed by: PHYSICAL THERAPIST

## 2023-08-28 PROCEDURE — 97110 THERAPEUTIC EXERCISES: CPT | Performed by: PHYSICAL THERAPIST

## 2023-08-28 NOTE — PROGRESS NOTES
Daily Note     Today's date: 2023  Patient name: aSm Elise. : 1960  MRN: [id number]  Referring provider: Bigg Khoury*  Dx:   Encounter Diagnosis     ICD-10-CM    1. History of total shoulder replacement, right  Z96.611                      Subjective: Patient states "I'm doing ok today". Objective: See treatment diary below      Assessment: Tolerated treatment well. Patient exhibited good technique with therapeutic exercises      Plan: Continue per plan of care.       Precautions Per Protocol       Manuals    Right Shoulder PROM 15 min 15 min 15 min 15 min 15 min                           Neuro Re-Ed                                                                 Ther Ex        Pulleys 5 min 5 min 5 min 5 min 5 min   Table Slides        Shrugs 2x10 2x10 2x10 2x10 2x10   Retractions 2x10 2x10 2x10 2x10 2x10   Bicep Curls        Supine Cane Flex 2x10 2x10 2x10 2x10 2x10   Forward Shoulder flexion        Finger Ladder 10x5" 20# 10x5" #20 10x5" #20 10x5" #20 10x5" #20   Scaption 5x 5x 5x 5x 5x                   Ther Activity                        Gait Training                        Modalities

## 2023-08-29 ENCOUNTER — APPOINTMENT (OUTPATIENT)
Dept: PHYSICAL THERAPY | Facility: CLINIC | Age: 63
End: 2023-08-29
Payer: COMMERCIAL

## 2023-08-31 ENCOUNTER — APPOINTMENT (OUTPATIENT)
Dept: PHYSICAL THERAPY | Facility: CLINIC | Age: 63
End: 2023-08-31
Payer: COMMERCIAL

## 2023-09-06 ENCOUNTER — EVALUATION (OUTPATIENT)
Dept: PHYSICAL THERAPY | Facility: CLINIC | Age: 63
End: 2023-09-06
Payer: COMMERCIAL

## 2023-09-06 DIAGNOSIS — Z96.611 HISTORY OF TOTAL SHOULDER REPLACEMENT, RIGHT: Primary | ICD-10-CM

## 2023-09-06 PROCEDURE — 97140 MANUAL THERAPY 1/> REGIONS: CPT | Performed by: PHYSICAL THERAPIST

## 2023-09-06 NOTE — PROGRESS NOTES
Daily Note     Today's date: 2023  Patient name: Brandi Alexandre. : 1960  MRN: [id number]  Referring provider: Rosa Nunez*  Dx:   Encounter Diagnosis     ICD-10-CM    1. History of total shoulder replacement, right  Z96.611                      Subjective: Patient without new c/o today. Objective: See treatment diary below      Assessment: Tolerated treatment well. Patient exhibited good technique with therapeutic exercises. Improving ROM noted each direction. Added IR/ER with TB today. Plan: Continue per plan of care.       Precautions Per Protocol       Manuals    Right Shoulder PROM 15 min 15 min 15 min 15 min 15 min                           Neuro Re-Ed                                                                 Ther Ex        Pulleys 5 min 5 min 5 min 5 min 5 min   Table Slides        Shrugs 2x10 2x10 2x10 2x10 2x10   Retractions 2x10 2x10 2x10 2x10 2x10   Bicep Curls        Supine Cane Flex 2x10 2x10 2x10 2x10 2x10   Forward Shoulder flexion        Finger Ladder 10x5" 20# 10x5" #20 10x5" #20 10x5" #20 10x5" #20   Scaption 5x 5x 5x 5x 5x   IR/ER with TB RTB 2x10 each                               Ther Activity                        Gait Training                        Modalities

## 2023-09-25 ENCOUNTER — APPOINTMENT (OUTPATIENT)
Dept: PHYSICAL THERAPY | Facility: CLINIC | Age: 63
End: 2023-09-25
Payer: COMMERCIAL

## 2023-09-27 ENCOUNTER — APPOINTMENT (OUTPATIENT)
Dept: PHYSICAL THERAPY | Facility: CLINIC | Age: 63
End: 2023-09-27
Payer: COMMERCIAL

## 2025-01-19 ENCOUNTER — HOSPITAL ENCOUNTER (OUTPATIENT)
Dept: ULTRASOUND IMAGING | Facility: HOSPITAL | Age: 65
Discharge: HOME | End: 2025-01-19
Payer: COMMERCIAL

## 2025-01-19 DIAGNOSIS — K70.31 ALCOHOLIC CIRRHOSIS OF LIVER WITH ASCITES (HCC): ICD-10-CM

## 2025-01-19 PROCEDURE — 76981 USE PARENCHYMA: CPT

## 2025-06-02 ENCOUNTER — DOCTOR'S OFFICE (OUTPATIENT)
Dept: URBAN - NONMETROPOLITAN AREA CLINIC 1 | Facility: CLINIC | Age: 65
Setting detail: OPHTHALMOLOGY
End: 2025-06-02
Payer: COMMERCIAL

## 2025-06-02 ENCOUNTER — OPTICAL OFFICE (OUTPATIENT)
Dept: URBAN - NONMETROPOLITAN AREA CLINIC 4 | Facility: CLINIC | Age: 65
Setting detail: OPHTHALMOLOGY
End: 2025-06-02
Payer: COMMERCIAL

## 2025-06-02 DIAGNOSIS — H43.393: ICD-10-CM

## 2025-06-02 DIAGNOSIS — H52.223: ICD-10-CM

## 2025-06-02 DIAGNOSIS — H25.013: ICD-10-CM

## 2025-06-02 DIAGNOSIS — H52.4: ICD-10-CM

## 2025-06-02 DIAGNOSIS — H25.13: ICD-10-CM

## 2025-06-02 PROCEDURE — 92015 DETERMINE REFRACTIVE STATE: CPT | Performed by: OPTOMETRIST

## 2025-06-02 PROCEDURE — V2204 LENS SPHCY BIFOCAL 4.00D/2.1: HCPCS | Mod: RT | Performed by: OPTOMETRIST

## 2025-06-02 PROCEDURE — 92014 COMPRE OPH EXAM EST PT 1/>: CPT | Performed by: OPTOMETRIST

## 2025-06-02 PROCEDURE — V2784 LENS POLYCARB OR EQUAL: HCPCS | Performed by: OPTOMETRIST

## 2025-06-02 PROCEDURE — V2203 LENS SPHCYL BIFOCAL 4.00D/.1: HCPCS | Mod: LT | Performed by: OPTOMETRIST

## 2025-06-02 PROCEDURE — V2020 VISION SVCS FRAMES PURCHASES: HCPCS | Performed by: OPTOMETRIST

## 2025-06-02 PROCEDURE — V2784 LENS POLYCARB OR EQUAL: HCPCS | Mod: LT | Performed by: OPTOMETRIST

## 2025-06-02 ASSESSMENT — REFRACTION_MANIFEST
OD_SPHERE: +3.25
OS_ADD: +2.50
OS_VA1: 20/30+2
OD_ADD: +2.50
OD_VA2: 20/30+2
OS_CYLINDER: -1.75
OS_AXIS: 090
OS_SPHERE: +3.00
OD_CYLINDER: -2.25
OS_VA2: 20/30+2
OD_AXIS: 080
OD_VA1: 20/30+2

## 2025-06-02 ASSESSMENT — REFRACTION_CURRENTRX
OD_CYLINDER: -1.75
OS_OVR_VA: 20/
OS_AXIS: 088
OD_OVR_VA: 20/
OS_VPRISM_DIRECTION: BF
OS_ADD: +2.75
OD_ADD: +2.75
OS_SPHERE: +3.75
OD_AXIS: 083
OD_VPRISM_DIRECTION: BF
OD_SPHERE: +3.00
OS_CYLINDER: -1.75

## 2025-06-02 ASSESSMENT — VISUAL ACUITY
OS_BCVA: 20/100
OD_BCVA: 20/60+1

## 2025-06-02 ASSESSMENT — REFRACTION_AUTOREFRACTION
OD_CYLINDER: -2.50
OS_SPHERE: +3.00
OS_AXIS: 092
OD_AXIS: 085
OD_SPHERE: +3.50
OS_CYLINDER: -2.25

## 2025-06-02 ASSESSMENT — CONFRONTATIONAL VISUAL FIELD TEST (CVF)
OD_FINDINGS: FULL
OS_FINDINGS: FULL

## 2025-06-02 ASSESSMENT — TONOMETRY
OS_IOP_MMHG: 14
OD_IOP_MMHG: 14